# Patient Record
Sex: FEMALE | Race: WHITE | Employment: UNEMPLOYED | URBAN - METROPOLITAN AREA
[De-identification: names, ages, dates, MRNs, and addresses within clinical notes are randomized per-mention and may not be internally consistent; named-entity substitution may affect disease eponyms.]

---

## 2018-03-06 ENCOUNTER — HOSPITAL ENCOUNTER (OUTPATIENT)
Dept: MAMMOGRAPHY | Age: 43
Discharge: HOME | End: 2018-03-06
Attending: OBSTETRICS & GYNECOLOGY
Payer: COMMERCIAL

## 2018-03-06 DIAGNOSIS — Z12.31 VISIT FOR SCREENING MAMMOGRAM: ICD-10-CM

## 2018-03-06 PROCEDURE — 77067 SCR MAMMO BI INCL CAD: CPT

## 2018-03-06 NOTE — PROGRESS NOTES
Please ensure that benign results were given to the patient. Please let her know that she will be receiving information from Marcia Vail regarding her breast density since she has dense breasts and may be at a higher overall risk for breast cancer. She is a candidate for 3D mammography with her mammogram next year. Can consider MRI as an option as well which can be ordered if desired.

## 2019-03-07 ENCOUNTER — HOSPITAL ENCOUNTER (OUTPATIENT)
Dept: MAMMOGRAPHY | Age: 44
Discharge: HOME | End: 2019-03-07
Attending: OBSTETRICS & GYNECOLOGY
Payer: COMMERCIAL

## 2019-03-07 DIAGNOSIS — R92.2 DENSE BREASTS: ICD-10-CM

## 2019-03-07 DIAGNOSIS — Z01.419 WELL WOMAN EXAM WITH ROUTINE GYNECOLOGICAL EXAM: ICD-10-CM

## 2019-03-07 DIAGNOSIS — Z12.39 SCREENING FOR BREAST CANCER: ICD-10-CM

## 2019-03-07 PROCEDURE — 77063 BREAST TOMOSYNTHESIS BI: CPT

## 2019-03-25 NOTE — PROGRESS NOTES
Please ensure that benign results were given to the patient. Please let her know that she will be receiving information from Deanna Almaguer regarding her breast density since she has dense breasts and may be at a higher overall risk for breast cancer. She is a candidate for 3D mammography with her mammogram next year. Can consider MRI as an option as well which can be ordered if desired. May come in for appt to discuss and go over risks if desired.